# Patient Record
Sex: FEMALE | Race: BLACK OR AFRICAN AMERICAN | Employment: UNEMPLOYED | URBAN - METROPOLITAN AREA
[De-identification: names, ages, dates, MRNs, and addresses within clinical notes are randomized per-mention and may not be internally consistent; named-entity substitution may affect disease eponyms.]

---

## 2019-08-09 ENCOUNTER — HOSPITAL ENCOUNTER (EMERGENCY)
Age: 64
Discharge: HOME | End: 2019-08-09
Attending: EMERGENCY MEDICINE
Payer: MEDICARE

## 2019-08-09 VITALS
SYSTOLIC BLOOD PRESSURE: 135 MMHG | TEMPERATURE: 98.8 F | BODY MASS INDEX: 24.16 KG/M2 | HEART RATE: 67 BPM | OXYGEN SATURATION: 100 % | DIASTOLIC BLOOD PRESSURE: 88 MMHG | HEIGHT: 65 IN | RESPIRATION RATE: 16 BRPM | WEIGHT: 145 LBS

## 2019-08-09 DIAGNOSIS — S61.210A LACERATION OF RIGHT INDEX FINGER WITHOUT FOREIGN BODY WITHOUT DAMAGE TO NAIL, INITIAL ENCOUNTER: Primary | ICD-10-CM

## 2019-08-09 DIAGNOSIS — Z23 NEED FOR TDAP VACCINATION: ICD-10-CM

## 2019-08-09 PROCEDURE — 74011250636 HC RX REV CODE- 250/636: Performed by: PHYSICIAN ASSISTANT

## 2019-08-09 PROCEDURE — 75810000293 HC SIMP/SUPERF WND  RPR

## 2019-08-09 PROCEDURE — 99283 EMERGENCY DEPT VISIT LOW MDM: CPT

## 2019-08-09 PROCEDURE — 90715 TDAP VACCINE 7 YRS/> IM: CPT | Performed by: PHYSICIAN ASSISTANT

## 2019-08-09 PROCEDURE — 90471 IMMUNIZATION ADMIN: CPT

## 2019-08-09 RX ORDER — CEPHALEXIN 500 MG/1
500 CAPSULE ORAL 4 TIMES DAILY
Qty: 28 CAP | Refills: 0 | Status: SHIPPED | OUTPATIENT
Start: 2019-08-09 | End: 2019-08-16

## 2019-08-09 RX ORDER — MELATONIN
DAILY
COMMUNITY

## 2019-08-09 RX ADMIN — TETANUS TOXOID, REDUCED DIPHTHERIA TOXOID AND ACELLULAR PERTUSSIS VACCINE, ADSORBED 0.5 ML: 5; 2.5; 8; 8; 2.5 SUSPENSION INTRAMUSCULAR at 14:07

## 2019-08-09 NOTE — ED PROVIDER NOTES
EMERGENCY DEPARTMENT HISTORY AND PHYSICAL EXAM    Date: 8/9/2019  Patient Name: Rayna Thornton    History of Presenting Illness     Chief Complaint   Patient presents with    Laceration         History Provided By: Patient    Chief Complaint: right finger laceration    HPI(Context):   1:18 PM  Rayna Thornton is a 66 y.o. female with hx of RA who presents to the emergency department C/O right second finger laceration. Pt was preparing dinner last night when she cut her finger with a knife. Bleeding controlled with pressure. Pt came to ED at urging of daughter. Pt denies numbness, weakness, and any other sxs or complaints. Pt unsure of last Tdap. Pt is active smoker. PCP: Nicanor Lynch MD    Current Outpatient Medications   Medication Sig Dispense Refill    golimumab (SIMPONI ARIA) 12.5 mg/mL soln solution by IntraVENous route.  cholecalciferol (VITAMIN D3) 1,000 unit tablet Take  by mouth daily.  cephALEXin (KEFLEX) 500 mg capsule Take 1 Cap by mouth four (4) times daily for 7 days. Indications: skin infection 28 Cap 0    CYANOCOBALAMIN, VITAMIN B-12, (VITAMIN B-12 PO) Take  by mouth.  ibuprofen (MOTRIN) 800 mg tablet Take  by mouth every six (6) hours as needed. Past History     Past Medical History:  Past Medical History:   Diagnosis Date    Constipation     Hypertension     Obesity     Rheumatoid arthritis ([address]     SOB (shortness of breath) on exertion        Past Surgical History:  Past Surgical History:   Procedure Laterality Date    BIOPSY LIVER  [id number]    ENDOSCOPY, COLON, DIAGNOSTIC  1998 and 2007    HX GASTRIC BYPASS  [id number]    Laparoscopic gastric bypass with a 100 cm Gretel limb bypass in a retrocolic, retrogastric fashion.       HX HERNIA REPAIR  [id number]    Hiatal hernia repair    HX ORTHOPAEDIC  [id number]    knee joint replacement    HX ORTHOPAEDIC      synovial tissue removed from wrist    HX ORTHOPAEDIC      cartilage torn/replaced       Family History:  Family History   Problem Relation Age of Onset    Diabetes Mother         obese    Cancer Mother         breast cancer    Hypertension Mother    [facility] Arthritis-osteo Mother     Hypertension Father         obese    Cancer Father     Other Father         gout    Arthritis-osteo Father     Heart Attack Father     Diabetes Sister     Asthma Brother     Arthritis-osteo Sister     Cancer Sister        Social History:  Social History     Tobacco Use    Smoking status: Current Every Day Smoker   Substance Use Topics    Alcohol use: Yes     Comment: weekend    Drug use: Not on file       Allergies: Allergies no known allergies      Review of Systems   Review of Systems   Musculoskeletal: Negative for arthralgias. Skin: Positive for wound. Neurological: Negative for weakness and numbness. Hematological: Does not bruise/bleed easily. All other systems reviewed and are negative. Physical Exam     Vitals:    08/09/19 1305   BP: 135/88   Pulse: 67   Resp: 16   Temp: 98.8 °F (37.1 °C)   SpO2: 100%   Weight: 65.8 kg (145 lb)   Height: 5' 5\" (1.651 m)     Physical Exam   Constitutional: She appears well-developed and well-nourished. No distress. AA female in NAD. Alert. Appears comfortable. HENT:   Head: Normocephalic and atraumatic. Right Ear: External ear normal.   Left Ear: External ear normal.   Eyes: Conjunctivae are normal.   Neck: Normal range of motion. Cardiovascular: Normal rate, regular rhythm, normal heart sounds and intact distal pulses. Pulses:       Radial pulses are 2+ on the right side, and 2+ on the left side. Pulmonary/Chest: Effort normal and breath sounds normal.   Musculoskeletal:        Right hand: She exhibits laceration. She exhibits normal range of motion, normal capillary refill, no deformity and no swelling. Normal sensation noted. Normal strength noted. Hands:  Skin: She is not diaphoretic. Nursing note and vitals reviewed.           Diagnostic Study Results Labs -   No results found for this or any previous visit (from the past 12 hour(s)). No orders to display     CT Results  (Last 48 hours)    None        CXR Results  (Last 48 hours)    None          Medications given in the ED-  Medications   diph,Pertuss(AC),Tet Vac-PF (BOOSTRIX) suspension 0.5 mL (0.5 mL IntraMUSCular Given 8/9/19 1407)         Medical Decision Making   I am the first provider for this patient. I reviewed the vital signs, available nursing notes, past medical history, past surgical history, family history and social history. Vital Signs-Reviewed the patient's vital signs. Pulse Oximetry Analysis - 100% on RA     Records Reviewed: Nursing Notes    Provider Notes (Medical Decision Making): superficial lac to R second finger. Will perform digital block and suture wound. NVI. FROM. No tendon involvement. Procedures:  Wound Repair  Date/Time: 8/9/2019 1:38 PM  Performed by: [employer] [facility] provider: Dr. Rosa Multani  Preparation: sterile field established and skin prepped with Shur-Clens  Time out: Immediately prior to the procedure a time out was called to verify the correct patient, procedure, equipment, staff and marking as appropriate. .  Location details: right index finger  Wound length:2.5 cm or less  Anesthesia: digital block    Anesthesia:  Local Anesthetic: lidocaine 1% without epinephrine  Anesthetic total: 5 mL  Foreign bodies: no foreign bodies  Irrigation solution: saline  Irrigation method: jet lavage  Debridement: minimal  Skin closure: 5-0 nylon  Number of sutures: 7  Technique: simple  Approximation: close  Dressing: antibiotic ointment and 4x4  Patient tolerance: Patient tolerated the procedure well with no immediate complications  My total time at bedside, performing this procedure was 16-30 minutes. ED Course:   1:18 PM Initial assessment performed.  The patients presenting problems have been discussed, and they are in agreement with the care plan formulated and outlined with them. I have encouraged them to ask questions as they arise throughout their visit. Diagnosis and Disposition       Successful suture repair. Reviewed proper wound care. Removal in 10 days. Tdap updated. Reasons to RTED discussed with pt. All questions answered. Pt feels comfortable going home at this time. Pt expressed understanding and she agrees with plan. 1. Laceration of right index finger without foreign body without damage to nail, initial encounter    2. Need for Tdap vaccination        PLAN:  1. D/C Home  2. Discharge Medication List as of 8/9/2019  3:23 PM      START taking these medications    Details   cephALEXin (KEFLEX) 500 mg capsule Take 1 Cap by mouth four (4) times daily for 7 days. Indications: skin infection, Print, Disp-28 Cap, R-0         CONTINUE these medications which have NOT CHANGED    Details   golimumab (SIMPONI ARIA) 12.5 mg/mL soln solution by IntraVENous route., Historical Med      cholecalciferol (VITAMIN D3) 1,000 unit tablet Take  by mouth daily. , Historical Med      CYANOCOBALAMIN, VITAMIN B-12, (VITAMIN B-12 PO) Take  by mouth. Historical Med      ibuprofen (MOTRIN) 800 mg tablet Take  by mouth every six (6) hours as needed., Historical Med           3. Follow-up Information     Follow up With Specialties Details Why [address]    [facility] EMERGENCY DEPT Emergency Medicine  removal in 10 days [address] [id number] [phone]    Tj Butt MD Internal Medicine  As needed, If symptoms worsen [facility]  [phone]          _______________________________    Attestations: This note is prepared by Arianna Paiz PA-C.  _______________________________          Please note that this dictation was completed with [employer], the [employer] voice recognition software.   Quite often unanticipated grammatical, syntax, homophones, and other interpretive errors are inadvertently transcribed by the computer software. Please disregard these errors. Please excuse any errors that have escaped final proofreading.

## 2019-08-09 NOTE — ED NOTES
I have reviewed discharge instructions with the patient. The patient verbalized understanding. Patient armband removed and shredded. Applied non stick dressing on finger.

## 2019-08-09 NOTE — ED TRIAGE NOTES
Patient reports cut RIGHT pointer digit last night. Bleeding contained, no active bleeding. Unsure when dTap last given.

## 2025-01-15 ENCOUNTER — HOSPITAL ENCOUNTER (EMERGENCY)
Facility: HOSPITAL | Age: 70
Discharge: HOME | End: 2025-01-15

## 2025-01-15 VITALS
WEIGHT: 140 LBS | HEART RATE: 80 BPM | RESPIRATION RATE: 15 BRPM | BODY MASS INDEX: 23.32 KG/M2 | OXYGEN SATURATION: 100 % | HEIGHT: 65 IN | DIASTOLIC BLOOD PRESSURE: 86 MMHG | SYSTOLIC BLOOD PRESSURE: 135 MMHG | TEMPERATURE: 97.9 F

## 2025-01-15 ASSESSMENT — PAIN - FUNCTIONAL ASSESSMENT: PAIN_FUNCTIONAL_ASSESSMENT: 0-10

## 2025-01-15 ASSESSMENT — PAIN SCALES - GENERAL: PAINLEVEL_OUTOF10: 7

## 2025-01-15 NOTE — ED TRIAGE NOTES
Patient ambulatory in ED for constipation. Patient stated she had not had a bowel movement in two weeks. Prior to triage patient had a bowel movement in the lobby bathroom. Patient states she feels better now but now 100% better.

## 2025-07-02 ENCOUNTER — OFFICE VISIT (OUTPATIENT)
Age: 70
End: 2025-07-02
Payer: MEDICARE

## 2025-07-02 VITALS
WEIGHT: 134.6 LBS | DIASTOLIC BLOOD PRESSURE: 75 MMHG | HEART RATE: 68 BPM | HEIGHT: 65 IN | SYSTOLIC BLOOD PRESSURE: 129 MMHG | BODY MASS INDEX: 22.42 KG/M2 | RESPIRATION RATE: 16 BRPM | OXYGEN SATURATION: 100 % | TEMPERATURE: 97.8 F

## 2025-07-02 DIAGNOSIS — Z80.0 FAMILY HISTORY- STOMACH CANCER: ICD-10-CM

## 2025-07-02 DIAGNOSIS — Z80.0 FAMILY HISTORY OF COLON CANCER: ICD-10-CM

## 2025-07-02 DIAGNOSIS — K64.8 INTERNAL HEMORRHOIDS: ICD-10-CM

## 2025-07-02 DIAGNOSIS — K21.9 GASTROESOPHAGEAL REFLUX DISEASE, UNSPECIFIED WHETHER ESOPHAGITIS PRESENT: ICD-10-CM

## 2025-07-02 DIAGNOSIS — F12.90 MARIJUANA USE: ICD-10-CM

## 2025-07-02 DIAGNOSIS — R10.13 EPIGASTRIC PAIN: Primary | ICD-10-CM

## 2025-07-02 DIAGNOSIS — K59.00 CONSTIPATION, UNSPECIFIED CONSTIPATION TYPE: ICD-10-CM

## 2025-07-02 DIAGNOSIS — Z87.891 SMOKING HISTORY: ICD-10-CM

## 2025-07-02 DIAGNOSIS — Z86.19 HISTORY OF HELICOBACTER PYLORI INFECTION: ICD-10-CM

## 2025-07-02 DIAGNOSIS — K90.9 INTESTINAL MALABSORPTION, UNSPECIFIED TYPE: ICD-10-CM

## 2025-07-02 PROCEDURE — 3017F COLORECTAL CA SCREEN DOC REV: CPT | Performed by: SPECIALIST

## 2025-07-02 PROCEDURE — 1090F PRES/ABSN URINE INCON ASSESS: CPT | Performed by: SPECIALIST

## 2025-07-02 PROCEDURE — G8428 CUR MEDS NOT DOCUMENT: HCPCS | Performed by: SPECIALIST

## 2025-07-02 PROCEDURE — 3074F SYST BP LT 130 MM HG: CPT | Performed by: SPECIALIST

## 2025-07-02 PROCEDURE — G8400 PT W/DXA NO RESULTS DOC: HCPCS | Performed by: SPECIALIST

## 2025-07-02 PROCEDURE — 3078F DIAST BP <80 MM HG: CPT | Performed by: SPECIALIST

## 2025-07-02 PROCEDURE — 1126F AMNT PAIN NOTED NONE PRSNT: CPT | Performed by: SPECIALIST

## 2025-07-02 PROCEDURE — 1123F ACP DISCUSS/DSCN MKR DOCD: CPT | Performed by: SPECIALIST

## 2025-07-02 PROCEDURE — 4004F PT TOBACCO SCREEN RCVD TLK: CPT | Performed by: SPECIALIST

## 2025-07-02 PROCEDURE — 99205 OFFICE O/P NEW HI 60 MIN: CPT | Performed by: SPECIALIST

## 2025-07-02 PROCEDURE — G8420 CALC BMI NORM PARAMETERS: HCPCS | Performed by: SPECIALIST

## 2025-07-02 RX ORDER — AMLODIPINE BESYLATE 10 MG/1
10 TABLET ORAL DAILY
COMMUNITY
Start: 2025-06-23

## 2025-07-02 RX ORDER — OMEPRAZOLE 40 MG/1
40 CAPSULE, DELAYED RELEASE ORAL DAILY
COMMUNITY
Start: 2025-06-23

## 2025-07-02 RX ORDER — BIOTIN 1 MG
1000 TABLET ORAL DAILY
COMMUNITY

## 2025-07-02 RX ORDER — HYDROCHLOROTHIAZIDE 25 MG/1
25 TABLET ORAL DAILY
COMMUNITY
Start: 2025-03-03

## 2025-07-02 RX ORDER — PSYLLIUM HUSK 0.4 G
CAPSULE ORAL DAILY
COMMUNITY

## 2025-07-02 RX ORDER — GOLIMUMAB 50 MG/4ML
SOLUTION INTRAVENOUS
COMMUNITY

## 2025-07-02 RX ORDER — POLYETHYLENE GLYCOL 3350 17 G/17G
17 POWDER, FOR SOLUTION ORAL DAILY PRN
COMMUNITY
Start: 2025-06-30 | End: 2025-11-13

## 2025-07-02 NOTE — PROGRESS NOTES
Follow-Up Surgery Consultation  Our Nov 2009 retro/ retro Gastric Bypass (with hiatal hernia repair) at 292 lbs  Not seen since Feb 2011  Here at urging of PCP for \"abdominal pain\"    Subjective:     The patient is a 69 y.o. obese female with a Body mass index is 22.4 kg/m².   Her starting weight was 292 lbs - she is 134 lbs today which she states is her lowest weight since surgery.  She is here today with a 6 month history of sporadic epigastric / generalized abdominal pains.  She is frustrated with the sporadic nature and her inability to correlate her pains with any causative action or agent.  She goes on to state she is \"always constipated\" and had to been seen in the ER recently for an impaction.  This pain has her concerned due to the fact her father had colon and stomach cancer.  Her last coloscopy was in 2022 has showed a polyp and internal hemorrhoids.  She can't remember her last EGD and states it was probably before her gastric bypass.  There have been no images of her abdominal cavity in the recent past to review.  She did have H. Pylori in the remote past and states that was treated by a GI provider.  Her only ulcerogenic agent exposure is very sporadic THC use at social events.  She does still have gallbladder and aside from a tubal ligation in the 1980s has had not other abdominal surgery other than our gastric bypass.    Bariatric comorbidities present are   Patient Active Problem List   Diagnosis    Status following gastric bypass for weight loss    Hypertension    History of gastric bypass    Intestinal malabsorption    GERD (gastroesophageal reflux disease)    Family history of colon cancer    Family history- stomach cancer    Smoking history    Marijuana use    History of Helicobacter pylori infection    Internal hemorrhoids       Patient Active Problem List    Diagnosis Date Noted    Intestinal malabsorption     GERD (gastroesophageal reflux disease)     Family history of colon cancer